# Patient Record
Sex: MALE | Race: AMERICAN INDIAN OR ALASKA NATIVE
[De-identification: names, ages, dates, MRNs, and addresses within clinical notes are randomized per-mention and may not be internally consistent; named-entity substitution may affect disease eponyms.]

---
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
## 2020-04-02 NOTE — XRAY REPORT
RIGHT WRIST 2 VIEWS



INDICATION:

FRACTURE.



COMPARISON:

No relevant prior imaging study available.



FINDINGS:

There is a minimally displaced intra-articular fracture of the distal radius primarily involving the 
radial styloid. There is also a minimally displaced ulnar styloid fracture. No carpal fracture is see
n. There is diffuse soft tissue swelling.



IMPRESSION:

1. Distal radial and ulnar fractures, as above.







Signer Name: Cole Tang MD 

Signed: 4/2/2020 4:30 PM

Workstation Name: [id number]

## 2020-05-05 ENCOUNTER — HOSPITAL ENCOUNTER (EMERGENCY)
Dept: HOSPITAL 5 - ED | Age: 30
Discharge: HOME | End: 2020-05-05
Payer: SELF-PAY

## 2020-05-05 VITALS — SYSTOLIC BLOOD PRESSURE: 130 MMHG | DIASTOLIC BLOOD PRESSURE: 85 MMHG
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
## 2020-05-05 DIAGNOSIS — F17.200: ICD-10-CM
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
## 2020-05-05 DIAGNOSIS — S52.591A: Primary | ICD-10-CM

## 2020-05-05 DIAGNOSIS — Z79.899: ICD-10-CM

## 2020-05-05 DIAGNOSIS — F12.10: ICD-10-CM

## 2020-05-05 PROCEDURE — 99283 EMERGENCY DEPT VISIT LOW MDM: CPT

## 2020-05-05 NOTE — XRAY REPORT
XR wrist 2V RT



INDICATION / CLINICAL INFORMATION:

Right wrist pain.



COMPARISON:

Right wrist radiographs on 4/2/2020.

 

FINDINGS:

BONES/JOINT(S): There has been some early healing of the right distal radial and ulnar fractures sinc
e the prior study without concerning change in alignment or other adverse change from the prior exam.




SOFT TISSUES: No significant abnormality.



ADDITIONAL FINDINGS: None.







Signer Name: Grover Dyer MD 

Signed: 5/5/2020 6:08 PM

Workstation Name: [facility] [id number]

## 2020-05-05 NOTE — EMERGENCY DEPARTMENT REPORT
ED Recheck HPI





- General


Chief Complaint: Extremity Problem,Nontraumatic


Stated Complaint: FOLLOW UP


Time Seen by Provider: 05/05/20 18:06


Source: patient


Mode of arrival: Ambulatory


Limitations: No Limitations





- History of Present Illness


Initial Comments: 





28 YO COMES TO ER P REMOVING HIS VOLAR SPLINT AND EXPERIENCING PAIN. SEEN HERE 

LAST WEEK AND DX WITH FRACTURES. 





NEUROVASC INTACT ON ARRIVAL


R HAND SWELLING





NO NEW TRAUMA





EDUCATED ON FX CARE





- Related Data


                                  Previous Rx's











 Medication  Instructions  Recorded  Last Taken  Type


 


HYDROcodone/APAP 5-325 [[place] 1 each PO Q6HR PRN #12 tablet 04/02/20 Unknown Rx





5/325]    











                                    Allergies











Allergy/AdvReac Type Severity Reaction Status Date / Time


 


No Known Allergies Allergy   Unverified 04/02/20 16:02














ED Review of Systems


ROS: 


Stated complaint: FOLLOW UP


Other details as noted in HPI





Comment: All other systems reviewed and negative





ED Past Medical Hx





- Past Medical History


Previous Medical History?: No





- Surgical History


Past Surgical History?: No





- Family History


Family history: no significant





- Social History


Smoking Status: Current Every Day Smoker


Substance Use Type: Alcohol, Marijuana





- Medications


Home Medications: 


                                Home Medications











 Medication  Instructions  Recorded  Confirmed  Last Taken  Type


 


HYDROcodone/APAP 5-325 [[place] 1 each PO Q6HR PRN #12 tablet 04/02/20  Unknown Rx





5/325]     














ED Physical Exam





- General


Limitations: No Limitations


General appearance: alert, in no apparent distress





- Head


Head exam: Present: atraumatic, normocephalic





- Eye


Eye exam: Present: normal appearance





- ENT


ENT exam: Present: mucous membranes moist





- Neck


Neck exam: Present: normal inspection





- Respiratory


Respiratory exam: Present: normal lung sounds bilaterally.  Absent: respiratory 

distress





- Cardiovascular


Cardiovascular Exam: Present: regular rate, normal rhythm.  Absent: systolic 

murmur, diastolic murmur, rubs, gallop





- GI/Abdominal


GI/Abdominal exam: Present: soft, normal bowel sounds





- Rectal


Rectal exam: Present: deferred





- Extremities Exam


Extremities exam: Present: normal inspection





- Expanded Upper Extremity Exam


  ** Right


Shoulder Exam: Present: normal inspection


Upper Arm exam: Present: normal inspection


Elbow exam: Present: normal inspection


Forearm Wrist exam: Present: tenderness, swelling


Hand Wrist exam: Present: swelling





- Back Exam


Back exam: Present: normal inspection





- Neurological Exam


Neurological exam: Present: alert, oriented X3





- Psychiatric


Psychiatric exam: Present: normal affect, normal mood





- Skin


Skin exam: Present: warm, dry, intact, normal color.  Absent: rash





ED Course


                                   Vital Signs











  05/05/20





  17:47


 


Temperature 98.2 F


 


Pulse Rate 47 L


 


Respiratory 18





Rate 


 


Blood Pressure 130/85


 


O2 Sat by Pulse 98





Oximetry 














ED Recheck MDM





- Core Measures


Measure Exclusions: not indicated





- Medical Decision Making





xray noted





fx not healed





volar replaced





pt educated on the need to follow up with ortho. He verbalizes understanding


neurovasc intact





dc home with follow up with Dr Venegas





                                   Vital Signs











  05/05/20





  17:47


 


Temperature 98.2 F


 


Pulse Rate 47 L


 


Respiratory 18





Rate 


 


Blood Pressure 130/85


 


O2 Sat by Pulse 98





Oximetry 











Critical care attestation.: 


If time is entered above; I have spent that time in minutes in the direct care 

of this critically ill patient, excluding procedure time.








ED Disposition


Clinical Impression: 


 Radius and ulna distal fracture





Disposition: DC-01 TO HOME OR SELFCARE


Is pt being admited?: No


Does the pt Need Aspirin: No


Condition: Stable


Additional Instructions: 


KEEP SPLINT ON 





ICE





REST





ELEVATE





MOTRIN OR TYLENOL FOR PAIN





FOLLOW UP WITH DR VENEGAS 


REFERRAL BELOW








Referrals: 


APRIL VENEGAS MD [Staff Physician] - 3-5 Days


Time of Disposition: 18:07

## 2020-05-07 ENCOUNTER — HOSPITAL ENCOUNTER (EMERGENCY)
Dept: HOSPITAL 5 - ED | Age: 30
Discharge: HOME | End: 2020-05-07
Payer: SELF-PAY

## 2020-05-07 VITALS — DIASTOLIC BLOOD PRESSURE: 67 MMHG | SYSTOLIC BLOOD PRESSURE: 149 MMHG
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
## 2020-05-07 DIAGNOSIS — S52.90XD: Primary | ICD-10-CM

## 2020-05-07 PROCEDURE — 99282 EMERGENCY DEPT VISIT SF MDM: CPT

## 2020-05-07 NOTE — EMERGENCY DEPARTMENT REPORT
Stated Complaint: SPLINT TIGHT


Time Seen by Provider: 05/07/20 16:58





- HPI


History of Present Illness: 





Mr. Mena is a 29-year-old that comes to the ER for the third time in the last 

while with his distal radius and ulna fracture.  After his first visit he took 

his splint off and had increasing pain so he returned to the ER for second 

visit.  Today he returns in because he says his fourth and fifth fingers are 

pinched in his splint.  The splint is not circumferential.  Of loosen the Ace 

bandage and the patient reports feeling better.





Patient prior to me even removing the ace had full range of motion of his distal

fingers rapid cap refill in the digits were warm.





- Exam


Vital Signs: 


Vital signs Per RN


Physical Exam: 





Rapid cap refill 


warm distal digits


Full range of motion digits














MSE screening note: 


Focused history and physical exam performed.


Due to findings the following was ordered:





Ace was loosened on splint.





Patient being discharged home he already has follow-up with Dr. Venegas.


Patient discussed with doctor:: AIRAM NICE





ED Disposition for MSE


Clinical Impression: 


 Radius and ulna distal fracture





Disposition: DC-01 TO HOME OR SELFCARE


Is pt being admited?: No


Does the pt Need Aspirin: No


Condition: Stable


Time of Disposition: 16:59

## 2022-03-28 ENCOUNTER — HOSPITAL ENCOUNTER (EMERGENCY)
Dept: HOSPITAL 5 - ED | Age: 32
Discharge: HOME | End: 2022-03-28
Payer: SELF-PAY

## 2022-03-28 VITALS — SYSTOLIC BLOOD PRESSURE: 142 MMHG | DIASTOLIC BLOOD PRESSURE: 71 MMHG

## 2022-03-28 DIAGNOSIS — Z79.899: ICD-10-CM
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
## 2022-03-28 DIAGNOSIS — S01.01XA: Primary | ICD-10-CM

## 2022-03-28 DIAGNOSIS — F17.200: ICD-10-CM
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
## 2022-03-28 PROCEDURE — 12004 RPR S/N/AX/GEN/TRK7.6-12.5CM: CPT

## 2022-03-28 PROCEDURE — 99282 EMERGENCY DEPT VISIT SF MDM: CPT

## 2022-03-28 RX ADMIN — LIDOCAINE HYDROCHLORIDE ONE ML: 10 INJECTION, SOLUTION INFILTRATION; PERINEURAL at 19:01

## 2022-03-28 NOTE — EMERGENCY DEPARTMENT REPORT
- General


Chief Complaint: Laceration/Recheck/Suture


Stated Complaint: LAC IN TOP OF HEAD


Time Seen by Provider: 03/28/22 18:06


Source: patient


Mode of arrival: Ambulatory


Limitations: No Limitations





- History of Present Illness


Initial Comments: 





31-year-old black male with no past medical history presents to the emergency 

department for evaluation of scalp laceration.  He states that he bumped his 

head on a metal bar earlier today.  Bleeding is controlled.  He denies loss of c

onsciousness, dizziness, nausea, vomiting, or headache.  He states that his Tdap

is up-to-date.


-: Sudden


Location: scalp


Place: home


Patient Tetanus UTD: Yes


Context: accidental


Associated Symptoms: none





- Related Data


                                  Previous Rx's











 Medication  Instructions  Recorded  Last Taken  Type


 


HYDROcodone/APAP 5-325 [[place] 1 each PO Q6HR PRN #12 tablet 04/02/20 Unknown Rx





5/325]    











                                    Allergies











Allergy/AdvReac Type Severity Reaction Status Date / Time


 


No Known Allergies Allergy   Unverified 04/02/20 16:02














ED Review of Systems


ROS: 


Stated complaint: LAC IN TOP OF HEAD


Other details as noted in HPI





Constitutional: denies: chills, fever


Eyes: denies: eye pain, vision change


ENT: denies: ear pain, hearing loss, epistaxis


Respiratory: denies: shortness of breath


Cardiovascular: denies: chest pain, palpitations


Gastrointestinal: denies: nausea, vomiting


Skin: other (Scalp laceration)


Neurological: denies: headache, weakness, numbness, paresthesias, confusion, 

abnormal gait, vertigo


Hematological/Lymphatic: denies: easy bleeding, easy bruising





ED Past Medical Hx





- Social History


Smoking Status: Current Every Day Smoker


Substance Use Type: None





- Medications


Home Medications: 


                                Home Medications











 Medication  Instructions  Recorded  Confirmed  Last Taken  Type


 


HYDROcodone/APAP 5-325 [[place] 1 each PO Q6HR PRN #12 tablet 04/02/20  Unknown Rx





5/325]     














ED Physical Exam





- General


Limitations: No Limitations


General appearance: in no apparent distress





- Head


Head exam: Present: normocephalic.  Absent: atraumatic





- Expanded Head Exam


  ** Expanded


Head exam: Present: laceration





                            __________________________














                            __________________________





 1 - 8 cm scalp laceration no bleeding noted.








- Eye


Eye exam: Present: normal appearance, PERRL, EOMI.  Absent: conjunctival 

injection, periorbital swelling, periorbital tenderness


Pupils: Present: normal accommodation





- Neck


Neck exam: Present: normal inspection





- Respiratory


Respiratory exam: Absent: respiratory distress





- Cardiovascular


Cardiovascular Exam: Present: regular rate





- GI/Abdominal


GI/Abdominal exam: Absent: distended





- Extremities Exam


Extremities exam: Present: normal inspection





- Back Exam


Back exam: Present: normal inspection





- Neurological Exam


Neurological exam: Present: alert, oriented X3, CN II-XII intact, normal gait, 

reflexes normal.  Absent: motor sensory deficit





- Expanded Neurological Exam


  ** Expanded


Patient oriented to: Present: person, place, time


Speech: Present: fluid speech


Cranial nerves: EOM's Intact: Normal, Gag Reflex: Normal, Tongue Deviation: 

Normal, Nystagmus: Normal, Facial Sensation: Normal


Ataxia: Absent: yes


Motor strength exam: RUE: 5, LUE: 5, RLE: 5, LLE: 5


Best Eye Response (Gaffney): (4) open spontaneously


Best Motor Response (Gaffney): (6) obeys commands


Best Verbal Response (Gaffney): (5) oriented


Chuy Total: 15





- Psychiatric


Psychiatric exam: Present: normal affect, normal mood





- Skin


Skin exam: Present: warm, dry, normal color





ED Course





                                   Vital Signs











  03/28/22





  13:59


 


Temperature 98.5 F


 


Pulse Rate 75


 


Respiratory 18





Rate 


 


Blood Pressure 117/43





[Right] 


 


O2 Sat by Pulse 99





Oximetry 














- Laceration /Wound Repair


  ** Head


Wound Location: head (Frontal scalp)


Wound Length (cm): 8


Wound's Depth, Shape: superficial


Wound Explored: clean


Irrigated w/ Saline (ccs): 40


Betadine Prep?: Yes


Anesthesia: 1% Lidocaine


Volume Anesthetic (ccs): 6


Number of Sutures: 9 (Staples)


Layer Closure?: No


Sterile Dressing Applied?: No


Progress: 





Wound cleaned toward thoroughly and irrigated, then anesthetized with 1% 

lidocaine without.  After anesthetized, 9 staples placed.  Wound well 

approximated.  No bleeding or drainage noted.  Patient tolerated well.





ED Medical Decision Making





- Medical Decision Making


31-year-old black male with no past medical history presents to the emergency 

department for evaluation of scalp laceration.  He states that he bumped his 

head on a metal bar earlier today.  Bleeding is controlled.  He denies loss of 

consciousness, dizziness, nausea, vomiting, or headache.  He states that his 

Tdap is up-to-date.





Patient without any neurologic deficits noted, no past medical history, and 

young age therefore low suspicion for cerebral hemorrhage.  Scalp laceration 

cleaned and 9 staples placed.  Patient tolerated well.  Patient's Tdap is 

up-to-date.  He was advised to monitor for signs of infection and return to the 

emergency department if any noted.  He was advised to return to the emergency 

department or follow-up with PCP in 10 to 14 days for staple removal.  He tawanda

balized understanding of and agreement with plan of care.





Critical care attestation.: 


If time is entered above; I have spent that time in minutes in the direct care 

of this critically ill patient, excluding procedure time.








ED Disposition


Clinical Impression: 


Scalp laceration


Qualifiers:


 Encounter type: initial encounter Qualified Code(s): S01.01XA - Laceration 

without foreign body of scalp, initial encounter





Head injury


Qualifiers:


 Encounter type: initial encounter Qualified Code(s): S09.90XA - Unspecified 

injury of head, initial encounter





Disposition: 01 HOME / SELF CARE / HOMELESS


Is pt being admited?: No


Does the pt Need Aspirin: No


Condition: Stable


Instructions:  Sutured Wound Care, Easy-to-Read, Laceration Care, Adult, 

Easy-to-Read, Head Injury, Adult, Easy-to-Read


Additional Instructions: 


Monitor for signs of infection.  Follow-up to the emergency department 

immediately if you note any signs of infection or any changes in mentation.  

Follow-up with PCP or in ED in 10 to 14 days to have staples removed.


Referrals: 


PATRICIA CRUZ MD [Referring] - 3-5 Days


Time of Disposition: 19:30

## 2022-04-11 ENCOUNTER — HOSPITAL ENCOUNTER (EMERGENCY)
Dept: HOSPITAL 5 - ED | Age: 32
Discharge: HOME | End: 2022-04-11
Payer: SELF-PAY

## 2022-04-11 VITALS — DIASTOLIC BLOOD PRESSURE: 81 MMHG | SYSTOLIC BLOOD PRESSURE: 121 MMHG

## 2022-04-11 DIAGNOSIS — S01.91XD: Primary | ICD-10-CM
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
## 2022-04-11 DIAGNOSIS — F17.200: ICD-10-CM

## 2022-04-11 PROCEDURE — 99282 EMERGENCY DEPT VISIT SF MDM: CPT

## 2022-04-11 NOTE — EMERGENCY DEPARTMENT REPORT
Suture/Staple Removal





- [facility]


Chief Complaint: Laceration/Recheck/Suture


Stated Complaint: SUTURE REMOVAL


When Sutures or Staples Placed: 8-10 Days Ago


Wound Location: Back of head





ED Review of Systems


ROS: 


Stated complaint: SUTURE REMOVAL


Other details as noted in HPI





Constitutional: denies: chills, fever


Eyes: denies: eye pain, eye discharge, vision change


ENT: denies: ear pain, throat pain


Respiratory: denies: cough, shortness of breath, wheezing


Cardiovascular: denies: chest pain, palpitations


Endocrine: no symptoms reported


Gastrointestinal: denies: abdominal pain, nausea, diarrhea


Genitourinary: denies: urgency, dysuria


Musculoskeletal: denies: back pain, joint swelling, arthralgia


Skin: other (9 staple intact to the back of the head).  denies: rash, lesions


Neurological: denies: headache, weakness, paresthesias


Psychiatric: denies: anxiety, depression


Hematological/Lymphatic: denies: easy bleeding, easy bruising





ED Past Medical Hx





- Social History


Smoking Status: Current Every Day Smoker


Substance Use Type: None





- Medications


Home Medications: 


                                Home Medications











 Medication  Instructions  Recorded  Confirmed  Last Taken  Type


 


HYDROcodone/APAP 5-325 [[place] 1 each PO Q6HR PRN #12 tablet 04/02/20  Unknown Rx





5/325]     














Suture Removal Exam





- Exam


General: 


Vital signs noted. No distress. Alert and acting appropriately.





Wound: No Pathologic Erythema, No Tenderness, No Drainage, No Pus, No Wound 

Dehiscence


Other Systems: 


All other systems reviewed and are unremarkable.








ED Course


                                   Vital Signs











  04/11/22





  17:49


 


Temperature 98.1 F


 


Pulse Rate 65


 


Respiratory 18





Rate 


 


Blood Pressure 134/72





[Right] 


 


O2 Sat by Pulse 99





Oximetry 














ED Recheck MDM





- Differential Diagnosis


Wound Recheck, Cellultitis Recheck, Suture/Staple Removal





- Medical Decision Making


31-year-old male presents to the ED with staples intact to the back of the head.

  9 Staple removal .  Patient tolerated procedure well.  No drainage noted. no 

erythema noted from site.  Patient is alert and oriented. No acute  distress 

noted .No ill appearance noted.  Patient is ambulatory.  Physical examination is

 unremarkable.





Rechecked the patient is resting quietly quietly and comfortable and feeling 

better. I discussed the results of diagnostic study, my clinical impression and 

the plan for further treatment with the patient. Patient agrees with plan and 

discharge at this present time. All question addressed.





I have given the patient instruction regarding a diagnosis ,expectation ,follow-

up and return precaution. I explained to the patient that emergent condition may

 arise and to return to the ED for new worsen and any new persisting condition. 

I have explained the importance of following up with the primary care physician 

or referral physician listed below has instructed. The patient verbalized 

understanding of discharge instruction.








Critical care attestation.: 


If time is entered above; I have spent that time in minutes in the direct care 

of this critically ill patient, excluding procedure time.








ED Disposition


Clinical Impression: 


 Encounter for staple removal





Disposition: 01 HOME / SELF CARE / HOMELESS


Is pt being admited?: No


Does the pt Need Aspirin: No


Condition: Stable


Instructions:  Wound Closure Removal, Care After


Additional Instructions: 


Take medication as prescribed


Return to ED for any worsening symptom


Referrals: 


[facility] [Provider Group] - 3-5 Days


Forms:  Work/School Release Form(ED)